# Patient Record
(demographics unavailable — no encounter records)

---

## 2025-03-20 NOTE — HISTORY OF PRESENT ILLNESS
[postmenopausal] : postmenopausal [Y] : Positive pregnancy history [Currently Active] : currently active [Men] : men [Vaginal] : vaginal [TextBox_19] : GABO [TextBox_25] : DENSE TISSUE [PapSmeardate] : 10/22 [TextBox_37] : GABO [ColonoscopyDate] : 2022 [LMPDate] : 07/23 [PGHxTotal] : 2 [[facility]FullTerm] : 2 [[facility]Living] : 2 [FreeTextEntry1] : C-SECTIONS